# Patient Record
Sex: FEMALE | Race: WHITE | NOT HISPANIC OR LATINO | URBAN - METROPOLITAN AREA
[De-identification: names, ages, dates, MRNs, and addresses within clinical notes are randomized per-mention and may not be internally consistent; named-entity substitution may affect disease eponyms.]

---
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
## 2023-07-23 ENCOUNTER — NON-APPOINTMENT (OUTPATIENT)
Age: 18
End: 2023-07-23

## 2024-02-21 ENCOUNTER — HOSPITAL ENCOUNTER (EMERGENCY)
Facility: OTHER | Age: 19
Discharge: HOME | End: 2024-02-21
Attending: EMERGENCY MEDICINE
Payer: COMMERCIAL

## 2024-02-21 VITALS
HEIGHT: 62 IN | OXYGEN SATURATION: 100 % | BODY MASS INDEX: 19.32 KG/M2 | HEART RATE: 84 BPM | RESPIRATION RATE: 18 BRPM | DIASTOLIC BLOOD PRESSURE: 86 MMHG | SYSTOLIC BLOOD PRESSURE: 134 MMHG | WEIGHT: 105 LBS | TEMPERATURE: 98 F

## 2024-02-21 DIAGNOSIS — G43.009 ATYPICAL MIGRAINE: Primary | ICD-10-CM

## 2024-02-21 LAB
ANION GAP SERPL CALC-SCNC: 10 MMOL/L (ref 8–16)
B-HCG UR QL: NEGATIVE
BASOPHILS # BLD AUTO: 0.01 K/UL (ref 0–0.2)
BASOPHILS NFR BLD: 0.1 % (ref 0–1.9)
BUN SERPL-MCNC: 11 MG/DL (ref 6–20)
CALCIUM SERPL-MCNC: 9.9 MG/DL (ref 8.7–10.5)
CHLORIDE SERPL-SCNC: 104 MMOL/L (ref 95–110)
CO2 SERPL-SCNC: 26 MMOL/L (ref 23–29)
CREAT SERPL-MCNC: 0.8 MG/DL (ref 0.5–1.4)
CTP QC/QA: YES
DIFFERENTIAL METHOD BLD: ABNORMAL
EOSINOPHIL # BLD AUTO: 0 K/UL (ref 0–0.5)
EOSINOPHIL NFR BLD: 0 % (ref 0–8)
ERYTHROCYTE [DISTWIDTH] IN BLOOD BY AUTOMATED COUNT: 12.4 % (ref 11.5–14.5)
EST. GFR  (NO RACE VARIABLE): >60 ML/MIN/1.73 M^2
GLUCOSE SERPL-MCNC: 130 MG/DL (ref 70–110)
HCT VFR BLD AUTO: 41.6 % (ref 37–48.5)
HGB BLD-MCNC: 13.5 G/DL (ref 12–16)
IMM GRANULOCYTES # BLD AUTO: 0.03 K/UL (ref 0–0.04)
IMM GRANULOCYTES NFR BLD AUTO: 0.3 % (ref 0–0.5)
LYMPHOCYTES # BLD AUTO: 0.7 K/UL (ref 1–4.8)
LYMPHOCYTES NFR BLD: 8.2 % (ref 18–48)
MCH RBC QN AUTO: 28.4 PG (ref 27–31)
MCHC RBC AUTO-ENTMCNC: 32.5 G/DL (ref 32–36)
MCV RBC AUTO: 87 FL (ref 82–98)
MONOCYTES # BLD AUTO: 0.2 K/UL (ref 0.3–1)
MONOCYTES NFR BLD: 2 % (ref 4–15)
NEUTROPHILS # BLD AUTO: 8 K/UL (ref 1.8–7.7)
NEUTROPHILS NFR BLD: 89.4 % (ref 38–73)
NRBC BLD-RTO: 0 /100 WBC
PLATELET # BLD AUTO: 235 K/UL (ref 150–450)
PMV BLD AUTO: 10 FL (ref 9.2–12.9)
POTASSIUM SERPL-SCNC: 4.3 MMOL/L (ref 3.5–5.1)
RBC # BLD AUTO: 4.76 M/UL (ref 4–5.4)
SODIUM SERPL-SCNC: 140 MMOL/L (ref 136–145)
WBC # BLD AUTO: 9 K/UL (ref 3.9–12.7)

## 2024-02-21 PROCEDURE — 81025 URINE PREGNANCY TEST: CPT | Performed by: NURSE PRACTITIONER

## 2024-02-21 PROCEDURE — 85025 COMPLETE CBC W/AUTO DIFF WBC: CPT | Performed by: NURSE PRACTITIONER

## 2024-02-21 PROCEDURE — 96375 TX/PRO/DX INJ NEW DRUG ADDON: CPT

## 2024-02-21 PROCEDURE — 63600175 PHARM REV CODE 636 W HCPCS

## 2024-02-21 PROCEDURE — 80048 BASIC METABOLIC PNL TOTAL CA: CPT | Performed by: NURSE PRACTITIONER

## 2024-02-21 PROCEDURE — 25000003 PHARM REV CODE 250

## 2024-02-21 PROCEDURE — 25000003 PHARM REV CODE 250: Performed by: NURSE PRACTITIONER

## 2024-02-21 PROCEDURE — 96361 HYDRATE IV INFUSION ADD-ON: CPT

## 2024-02-21 PROCEDURE — 96374 THER/PROPH/DIAG INJ IV PUSH: CPT

## 2024-02-21 PROCEDURE — 99284 EMERGENCY DEPT VISIT MOD MDM: CPT | Mod: 25

## 2024-02-21 RX ORDER — ACETAMINOPHEN 500 MG
1000 TABLET ORAL
Status: DISCONTINUED | OUTPATIENT
Start: 2024-02-21 | End: 2024-02-21

## 2024-02-21 RX ORDER — DEXAMETHASONE SODIUM PHOSPHATE 4 MG/ML
8 INJECTION, SOLUTION INTRA-ARTICULAR; INTRALESIONAL; INTRAMUSCULAR; INTRAVENOUS; SOFT TISSUE
Status: COMPLETED | OUTPATIENT
Start: 2024-02-21 | End: 2024-02-21

## 2024-02-21 RX ORDER — KETOROLAC TROMETHAMINE 30 MG/ML
10 INJECTION, SOLUTION INTRAMUSCULAR; INTRAVENOUS
Status: COMPLETED | OUTPATIENT
Start: 2024-02-21 | End: 2024-02-21

## 2024-02-21 RX ORDER — DIPHENHYDRAMINE HYDROCHLORIDE 50 MG/ML
25 INJECTION INTRAMUSCULAR; INTRAVENOUS
Status: COMPLETED | OUTPATIENT
Start: 2024-02-21 | End: 2024-02-21

## 2024-02-21 RX ORDER — BUTALBITAL, ACETAMINOPHEN AND CAFFEINE 50; 325; 40 MG/1; MG/1; MG/1
1 TABLET ORAL EVERY 4 HOURS PRN
Qty: 12 TABLET | Refills: 0 | Status: SHIPPED | OUTPATIENT
Start: 2024-02-21 | End: 2024-02-23

## 2024-02-21 RX ORDER — BUTALBITAL, ACETAMINOPHEN AND CAFFEINE 50; 325; 40 MG/1; MG/1; MG/1
1 TABLET ORAL
Status: COMPLETED | OUTPATIENT
Start: 2024-02-21 | End: 2024-02-21

## 2024-02-21 RX ORDER — METOCLOPRAMIDE HYDROCHLORIDE 5 MG/ML
10 INJECTION INTRAMUSCULAR; INTRAVENOUS
Status: COMPLETED | OUTPATIENT
Start: 2024-02-21 | End: 2024-02-21

## 2024-02-21 RX ADMIN — DEXAMETHASONE SODIUM PHOSPHATE 8 MG: 4 INJECTION INTRA-ARTICULAR; INTRALESIONAL; INTRAMUSCULAR; INTRAVENOUS; SOFT TISSUE at 07:02

## 2024-02-21 RX ADMIN — DIPHENHYDRAMINE HYDROCHLORIDE 25 MG: 50 INJECTION, SOLUTION INTRAMUSCULAR; INTRAVENOUS at 04:02

## 2024-02-21 RX ADMIN — BUTALBITAL, ACETAMINOPHEN, AND CAFFEINE 1 TABLET: 50; 325; 40 TABLET ORAL at 06:02

## 2024-02-21 RX ADMIN — SODIUM CHLORIDE 1000 ML: 9 INJECTION, SOLUTION INTRAVENOUS at 03:02

## 2024-02-21 RX ADMIN — METOCLOPRAMIDE 10 MG: 5 INJECTION, SOLUTION INTRAMUSCULAR; INTRAVENOUS at 04:02

## 2024-02-21 RX ADMIN — KETOROLAC TROMETHAMINE 10 MG: 30 INJECTION, SOLUTION INTRAMUSCULAR; INTRAVENOUS at 04:02

## 2024-02-21 NOTE — ED NOTES
Assumed care of pt. Pt reporting 4 days of migraine with pain in head radiating down R jaw and neck. Feels tingling in face. Smile and tongue symmetrical.

## 2024-02-21 NOTE — FIRST PROVIDER EVALUATION
" Emergency Department TeleTriage Encounter Note      CHIEF COMPLAINT    Chief Complaint   Patient presents with    Headache     Pt reporting R sided HA x 4 days with associated R sided facial numbness and R sided neck pain. Hx of migraines. Pt also endorsing nausea. Denies vision changes.       VITAL SIGNS   Initial Vitals [02/21/24 1419]   BP Pulse Resp Temp SpO2   139/83 110 19 97.6 °F (36.4 °C) 98 %      MAP       --            ALLERGIES    Review of patient's allergies indicates:   Allergen Reactions    Azithromycin Other (See Comments)    Amoxicillin Rash    Penicillins Rash       PROVIDER TRIAGE NOTE  This is a teletriage evaluation of a 18 y.o. female presenting to the ED complaining of right-sided headache for four days. Reports "tingling" in her face since yesterday and also right sided neck pain. PMhx of migraines. Pmhx of hashimoto and states that sometimes "when my levels are off, I feel bad like I do now."    Alert, no obvious focal neuro deficit noted. Moving BUE and face symmetrically.     Initial orders will be placed and care will be transferred to an alternate provider when patient is roomed for a full evaluation. Any additional orders and the final disposition will be determined by that provider.         ORDERS  Labs Reviewed - No data to display    ED Orders (720h ago, onward)      Start Ordered     Status Ordering Provider    02/21/24 1445 02/21/24 1430  sodium chloride 0.9% bolus 1,000 mL 1,000 mL  ED 1 Time         Ordered NATIVIDAD MURARY N.    02/21/24 1430 02/21/24 1430  CBC auto differential  STAT         Ordered NATIVIDAD MURRAY.    02/21/24 1430 02/21/24 1430  Basic metabolic panel  STAT         Ordered NATIVIDAD MURRAY N.    02/21/24 1430 02/21/24 1430  Insert Saline lock IV  Once         Ordered NATIVIDAD MURRAY N.    02/21/24 1430 02/21/24 1430  POCT urine pregnancy  Once         Ordered NATIVIDAD MURRAY              Virtual Visit Note: The provider " triage portion of this emergency department evaluation and documentation was performed via [employer], a HIPAA-compliant telemedicine application, in concert with a tele-presenter in the room. A face to face patient evaluation with one of my colleagues will occur once the patient is placed in an emergency department room.      DISCLAIMER: This note was prepared with [employer] voice recognition transcription software. Garbled syntax, mangled pronouns, and other bizarre constructions may be attributed to that software system.

## 2024-02-21 NOTE — ED TRIAGE NOTES
C/o right sided headache x 4 days. States had virtual appt with neurologist on Monday and was prescribed Dexamethasone for symptoms - states taking with no relief. Went to  yesterday and received Toradol injection with no relief. States right side of face feels numb but facial movements are symmetrical. Denies cough/cold symptoms but states sinuses do feel congested. No fever reported. Presents awake, alert.

## 2024-02-21 NOTE — Clinical Note
"Luz Elena "Livan Mendoza was seen and treated in our emergency department on 2/21/2024.  She may return to school on 02/22/2024.      If you have any questions or concerns, please don't hesitate to call.      Flaca Platt PA-C"

## 2024-02-22 NOTE — ED PROVIDER NOTES
Encounter Date: 2/21/2024       History     Chief Complaint   Patient presents with    Headache     Pt reporting R sided HA x 4 days with associated R sided facial numbness and R sided neck pain. Hx of migraines. Pt also endorsing nausea. Denies vision changes.     18-year-old female with past medical history of migraines presenting for evaluation of right-sided headache with associated right-sided facial numbness and right-sided arm pain x4 days.  Patient states that she developed a migraine that she describes as a sharp pain behind her bilateral eyes 4 days ago.  She states that this was consistent with her typical migraines and she reached out to her neurologist at home, who prescribed her a steroid pack.  She reports that she has been taking the steroid pack and her initial migraine improved, however she continues to have a dull aching pain to the right side of her head that is not consistent with her normal migraines. She rates this pain a 6/10. She also reports that this pain radiates to the right side of her face and she reports associated numbness/tingling feeling.  She states she was also had an numbness and tingling present to her right hand along with a pain to her right upper back. She reports some mild associated nausea, no vomiting. She denies any trauma or injury.  She reports being seen at urgent care yesterday and was given a toradol shot with no improvement in symptoms. She denies any fever, chills, nausea, vomiting.  She denies any visual changes.  Reports taking the steroid Dosepak with no relief.  She also reports that she is on a monthly injection for migraines.  She denies any associated weakness.  Denies any fever, chills, cough, congestion, abdominal pain,vomiting, chest pain, shortness of breath.    The history is provided by the patient.     Review of patient's allergies indicates:   Allergen Reactions    Azithromycin Other (See Comments)    Amoxicillin Rash    Penicillins Rash     No past  medical history on file.  No past surgical history on file.  No family history on file.  Social History     Tobacco Use    Smoking status: Never    Smokeless tobacco: Never     Review of Systems   Constitutional:  Negative for chills and fever.   HENT:  Negative for congestion, ear discharge, ear pain, hearing loss, sinus pressure, sinus pain and sore throat.    Eyes:  Negative for visual disturbance.   Respiratory:  Negative for shortness of breath.    Cardiovascular:  Negative for chest pain.   Gastrointestinal:  Positive for nausea. Negative for abdominal pain and vomiting.   Genitourinary:  Negative for difficulty urinating and dysuria.   Musculoskeletal:  Negative for arthralgias and back pain.   Skin:  Negative for rash.   Neurological:  Positive for headaches. Negative for syncope and light-headedness.   story    Physical Exam     Initial Vitals [02/21/24 1419]   BP Pulse Resp Temp SpO2   139/83 110 19 97.6 °F (36.4 °C) 98 %      MAP       --         Physical Exam    Constitutional: She appears well-developed and well-nourished. She is not diaphoretic. No distress.   Patient laying comfortably in bed playing on phone.   HENT:   Head: Normocephalic and atraumatic.   No visible swelling, no tenderness to palpation of the face/head. No swelling/erythema of bilateral TM. Left TM unable to be visualized secondary to cerumen impaction. Right TM partially visualized, partially obscured secondary to cerumen. Visualized portion of TM with no retraction, erythema, bulging. Pain to right jaw elicited with opening jaw, pain present overlying TMJ.    Eyes: Conjunctivae and EOM are normal. Pupils are equal, round, and reactive to light.   No photophobia.    Cardiovascular:  Normal rate, regular rhythm and normal heart sounds.           No murmur heard.  Abdominal: Abdomen is soft. She exhibits no distension. There is no abdominal tenderness.   Musculoskeletal:         General: Normal range of motion.     Neurological: She  is alert and oriented to person, place, and time. GCS score is 15. GCS eye subscore is 4. GCS verbal subscore is 5. GCS motor subscore is 6.   Sensation and motor intact x4 extremities.  Strength is equal bilaterally upper and lower extremities.  Cranial nerves 2-12 intact.  Cerebellar function intact.  Gait intact.  No pronator drift.  No facial droop.  Speech is clear and coherent.   Skin: Skin is warm and dry.   Psychiatric: She has a normal mood and affect.         ED Course   Procedures  Labs Reviewed   CBC W/ AUTO DIFFERENTIAL - Abnormal; Notable for the following components:       Result Value    Gran # (ANC) 8.0 (*)     Lymph # 0.7 (*)     Mono # 0.2 (*)     Gran % 89.4 (*)     Lymph % 8.2 (*)     Mono % 2.0 (*)     All other components within normal limits   BASIC METABOLIC PANEL - Abnormal; Notable for the following components:    Glucose 130 (*)     All other components within normal limits   POCT URINE PREGNANCY          Imaging Results              CT Head Without Contrast (Final result)  Result time 02/21/24 17:58:16      Final result by Osmany Rg MD (02/21/24 17:58:16)                   Impression:      No acute intracranial abnormality.  Specifically, no intracranial hemorrhage.      Electronically signed by: Osmany Rg MD  Date:    02/21/2024  Time:    17:58               Narrative:    EXAMINATION:  CT HEAD WITHOUT CONTRAST    CLINICAL HISTORY:  Headache, chronic, new features or increased frequency;    TECHNIQUE:  Low dose axial CT images obtained throughout the head without intravenous contrast. Sagittal and coronal reconstructions were performed.    COMPARISON:  None.    FINDINGS:  Intracranial compartment: Brain appears normally formed.    Ventricles and sulci are normal in size for age without evidence of hydrocephalus. No extra-axial blood or fluid collections.    The brain parenchyma appears normal. No parenchymal mass, hemorrhage, edema or major vascular distribution  infarct.    Skull/extracranial contents (limited evaluation): No fracture. Mastoid air cells and paranasal sinuses are essentially clear.  Imaged portions of the orbits are within normal limits.                                       Medications   sodium chloride 0.9% bolus 1,000 mL 1,000 mL (0 mLs Intravenous Stopped 2/21/24 1619)   ketorolac injection 9.999 mg (9.999 mg Intravenous Given 2/21/24 1620)   diphenhydrAMINE injection 25 mg (25 mg Intravenous Given 2/21/24 1620)   metoclopramide injection 10 mg (10 mg Intravenous Given 2/21/24 1620)   butalbital-acetaminophen-caffeine -40 mg per tablet 1 tablet (1 tablet Oral Given 2/21/24 1817)   dexAMETHasone injection 8 mg (8 mg Intravenous Given 2/21/24 1947)     Medical Decision Making  Urgent evaluation of 18 year old female with PMH of migraines and thyroid problem presenting for evaluation of headache x 4 days. Patient afebrile and hemodynamically stable on evaluation. No acute distress, resting comfortably in bed. Normal neurologic exam, no deficits noted. Patient reporting paresthesias to right face/hand, however on my exam there are no sensory deficits noted. Low suspicion for acute etiology such as CVA/TIA. Patient with history of headaches and migraines on chart review, appears she has received MRI in the past with no acute results. Do not believe emergent imaging is indicated. Labs ordered by teletriage provider unremarkable. Suspect patient's symptoms secondary to atypical migraine, also may have component of TMJ given jaw pain/paresthesias. Will give headache cocktail and reassess.    Differential Diagnosis includes, but is not limited to:  Ischemic stroke, hemorrhagic stroke, subarachnoid hemorrhage/ruptured aneurysm, intracranial lesion/mass, meningitis/encephalitis, epidural hematoma, subdural hematoma, pseudotumor cerebri, venous sinus thrombosis, CO poisoning, hypertensive encephalopathy, MI/ACS, head trauma/contusion, concussion, sinus  headache, dehydration, anxiety, medication non-compliance, primary headache (tension/cluster/migraine).    On reassessment after headache cocktail, patient reported minimal improvement in symptoms. Discussed this option of imaging with patient, given migraine differs in character from normal migraines and minimal improvement with medication, will obtain CT head, give dose of fioricet for pain. Patient with normal CT head, minimal improvement with fioricet. Discussed further medication management with patient to break cycle of headache, gave dose of IV decadron. Patient on re-evaluation at this point requesting discharge, stating she still has pain and paresthesias but they are mildly improved, and she would prefer to follow up outpatient. Suspect component of TMJ to symptoms given pain elicited with jaw movement. Do not suspect acute emergent etiology of symptoms today. No indication for further emergent management at this time. Patient is returning home in 2 weeks and has an ENT and neuro provider she sees at home, I advised close follow up with these providers. Strict ER return precautions given. I discussed this case with my attending provider, Dr. Milton, who was in agreement with plan.     Amount and/or Complexity of Data Reviewed  Labs:  Decision-making details documented in ED Course.  Radiology: ordered.    Risk  Prescription drug management.               ED Course as of 02/21/24 2150   Wed Feb 21, 2024   1551 hCG Qualitative, Urine: Negative [SW]      ED Course User Index  [SW] Flaca Platt PA-C                           Clinical Impression:  Final diagnoses:  [G43.009] Atypical migraine (Primary)          ED Disposition Condition    Discharge           ED Prescriptions       Medication Sig Dispense Start Date End Date Auth. Provider    butalbital-acetaminophen-caffeine -40 mg (FIORICET, ESGIC) -40 mg per tablet Take 1 tablet by mouth every 4 (four) hours as needed for Pain or Headaches. 12  tablet 2/21/2024 3/22/2024 Flaca Platt PA-C          Follow-up Information       Follow up With Specialties Details Why Contact Info    Your ENT  Schedule an appointment as soon as possible for a visit       Your neurologist  Schedule an appointment as soon as possible for a visit       [facility], [place] Emergency Dept Emergency Medicine Go to  If symptoms worsen [address] [address]  [phone]             Flaca Platt PA-C  02/22/24 0353

## 2024-02-22 NOTE — DISCHARGE INSTRUCTIONS

## 2024-02-23 ENCOUNTER — OFFICE VISIT (OUTPATIENT)
Dept: INTERNAL MEDICINE | Facility: CLINIC | Age: 19
End: 2024-02-23
Payer: COMMERCIAL

## 2024-02-23 ENCOUNTER — LAB VISIT (OUTPATIENT)
Dept: LAB | Facility: HOSPITAL | Age: 19
End: 2024-02-23
Payer: COMMERCIAL

## 2024-02-23 VITALS
HEIGHT: 62 IN | SYSTOLIC BLOOD PRESSURE: 112 MMHG | DIASTOLIC BLOOD PRESSURE: 62 MMHG | WEIGHT: 108.94 LBS | HEART RATE: 95 BPM | OXYGEN SATURATION: 99 % | BODY MASS INDEX: 20.05 KG/M2

## 2024-02-23 DIAGNOSIS — R20.0 RIGHT FACIAL NUMBNESS: ICD-10-CM

## 2024-02-23 DIAGNOSIS — R20.2 NUMBNESS AND TINGLING SENSATION OF SKIN: ICD-10-CM

## 2024-02-23 DIAGNOSIS — M79.10 MUSCLE SORENESS: ICD-10-CM

## 2024-02-23 DIAGNOSIS — G43.919 INTRACTABLE MIGRAINE WITHOUT STATUS MIGRAINOSUS, UNSPECIFIED MIGRAINE TYPE: Primary | ICD-10-CM

## 2024-02-23 DIAGNOSIS — R20.0 NUMBNESS AND TINGLING SENSATION OF SKIN: ICD-10-CM

## 2024-02-23 DIAGNOSIS — G43.919 INTRACTABLE MIGRAINE WITHOUT STATUS MIGRAINOSUS, UNSPECIFIED MIGRAINE TYPE: ICD-10-CM

## 2024-02-23 DIAGNOSIS — E07.9 THYROID DISEASE: ICD-10-CM

## 2024-02-23 DIAGNOSIS — M26.621 ARTHRALGIA OF RIGHT TEMPOROMANDIBULAR JOINT: ICD-10-CM

## 2024-02-23 LAB
ALBUMIN SERPL BCP-MCNC: 4.6 G/DL (ref 3.5–5.2)
ALP SERPL-CCNC: 71 U/L (ref 55–135)
ALT SERPL W/O P-5'-P-CCNC: 27 U/L (ref 10–44)
ANION GAP SERPL CALC-SCNC: 7 MMOL/L (ref 8–16)
AST SERPL-CCNC: 22 U/L (ref 10–40)
BASOPHILS # BLD AUTO: 0.02 K/UL (ref 0–0.2)
BASOPHILS NFR BLD: 0.3 % (ref 0–1.9)
BILIRUB SERPL-MCNC: 0.8 MG/DL (ref 0.1–1)
BUN SERPL-MCNC: 9 MG/DL (ref 6–20)
CALCIUM SERPL-MCNC: 9.5 MG/DL (ref 8.7–10.5)
CHLORIDE SERPL-SCNC: 105 MMOL/L (ref 95–110)
CK SERPL-CCNC: 529 U/L (ref 20–180)
CO2 SERPL-SCNC: 26 MMOL/L (ref 23–29)
CREAT SERPL-MCNC: 0.7 MG/DL (ref 0.5–1.4)
CRP SERPL-MCNC: <0.3 MG/L (ref 0–8.2)
DIFFERENTIAL METHOD BLD: ABNORMAL
EOSINOPHIL # BLD AUTO: 0 K/UL (ref 0–0.5)
EOSINOPHIL NFR BLD: 0.3 % (ref 0–8)
ERYTHROCYTE [DISTWIDTH] IN BLOOD BY AUTOMATED COUNT: 12.4 % (ref 11.5–14.5)
ERYTHROCYTE [SEDIMENTATION RATE] IN BLOOD BY PHOTOMETRIC METHOD: <2 MM/HR (ref 0–36)
EST. GFR  (NO RACE VARIABLE): >60 ML/MIN/1.73 M^2
GLUCOSE SERPL-MCNC: 96 MG/DL (ref 70–110)
HCT VFR BLD AUTO: 45.8 % (ref 37–48.5)
HGB BLD-MCNC: 14.1 G/DL (ref 12–16)
IMM GRANULOCYTES # BLD AUTO: 0.01 K/UL (ref 0–0.04)
IMM GRANULOCYTES NFR BLD AUTO: 0.2 % (ref 0–0.5)
LYMPHOCYTES # BLD AUTO: 1.9 K/UL (ref 1–4.8)
LYMPHOCYTES NFR BLD: 29.1 % (ref 18–48)
MCH RBC QN AUTO: 28.3 PG (ref 27–31)
MCHC RBC AUTO-ENTMCNC: 30.8 G/DL (ref 32–36)
MCV RBC AUTO: 92 FL (ref 82–98)
MONOCYTES # BLD AUTO: 0.5 K/UL (ref 0.3–1)
MONOCYTES NFR BLD: 6.8 % (ref 4–15)
NEUTROPHILS # BLD AUTO: 4.2 K/UL (ref 1.8–7.7)
NEUTROPHILS NFR BLD: 63.3 % (ref 38–73)
NRBC BLD-RTO: 0 /100 WBC
PLATELET # BLD AUTO: 258 K/UL (ref 150–450)
PMV BLD AUTO: 10.3 FL (ref 9.2–12.9)
POTASSIUM SERPL-SCNC: 4.1 MMOL/L (ref 3.5–5.1)
PROT SERPL-MCNC: 7.6 G/DL (ref 6–8.4)
RBC # BLD AUTO: 4.99 M/UL (ref 4–5.4)
SODIUM SERPL-SCNC: 138 MMOL/L (ref 136–145)
TSH SERPL DL<=0.005 MIU/L-ACNC: 2.99 UIU/ML (ref 0.4–4)
WBC # BLD AUTO: 6.59 K/UL (ref 3.9–12.7)

## 2024-02-23 PROCEDURE — 36415 COLL VENOUS BLD VENIPUNCTURE: CPT | Performed by: PHYSICIAN ASSISTANT

## 2024-02-23 PROCEDURE — 99999 PR PBB SHADOW E&M-EST. PATIENT-LVL V: CPT | Mod: PBBFAC,,, | Performed by: PHYSICIAN ASSISTANT

## 2024-02-23 PROCEDURE — 80053 COMPREHEN METABOLIC PANEL: CPT | Performed by: PHYSICIAN ASSISTANT

## 2024-02-23 PROCEDURE — 82550 ASSAY OF CK (CPK): CPT | Performed by: PHYSICIAN ASSISTANT

## 2024-02-23 PROCEDURE — 84443 ASSAY THYROID STIM HORMONE: CPT | Performed by: PHYSICIAN ASSISTANT

## 2024-02-23 PROCEDURE — 85652 RBC SED RATE AUTOMATED: CPT | Performed by: PHYSICIAN ASSISTANT

## 2024-02-23 PROCEDURE — 85025 COMPLETE CBC W/AUTO DIFF WBC: CPT | Performed by: PHYSICIAN ASSISTANT

## 2024-02-23 PROCEDURE — 86140 C-REACTIVE PROTEIN: CPT | Performed by: PHYSICIAN ASSISTANT

## 2024-02-23 PROCEDURE — 99214 OFFICE O/P EST MOD 30 MIN: CPT | Mod: S$GLB,,, | Performed by: PHYSICIAN ASSISTANT

## 2024-02-23 RX ORDER — GABAPENTIN 300 MG/1
300 CAPSULE ORAL 3 TIMES DAILY
Qty: 90 CAPSULE | Refills: 11 | Status: SHIPPED | OUTPATIENT
Start: 2024-02-23 | End: 2025-02-22

## 2024-02-23 RX ORDER — TIZANIDINE 4 MG/1
4 TABLET ORAL NIGHTLY PRN
Qty: 20 TABLET | Refills: 0 | Status: SHIPPED | OUTPATIENT
Start: 2024-02-23 | End: 2024-03-04

## 2024-02-23 RX ORDER — GALCANEZUMAB 120 MG/ML
INJECTION, SOLUTION SUBCUTANEOUS
COMMUNITY
Start: 2024-01-30

## 2024-02-23 RX ORDER — DEXAMETHASONE 4 MG/1
TABLET ORAL
COMMUNITY
Start: 2024-02-19

## 2024-02-23 NOTE — PROGRESS NOTES
Subjective     Patient ID: Luz Elena Mendoza is a 18 y.o. female with PMH of migraines and thyroid    Chief Complaint: Numbness (Hands, toes, jaw) and Back Pain    HPI    Established pt of No, Primary Doctor (new to me)    Pt here with concerns of migraine, n/t sensation and back pain.     Pt attended by boyfriend. Also spoke with mom via phone    Symptoms onset was 6 days ago with a Migraine(right sided, typical), her Neurologist back home in [place] prescribed steroids. The next day migraine pain continued and she started to experience numbness to right face, she was seen in the urgent care, treated with IM Toradol but symptoms persist prompting her to be seen in the ED 2 days ago, in the ED lab and CT scan were obtained, unremarkable. She was treated with  IV fluids, Fioricet, Toradol and steroid with only mild relief. Per chart review per requested discharge and outpatient follow up.       Today she reports migraines is a dull ache, most concern is the numbness sensation to right face, hands and toes feel tingling, also notes soreness/tightness of back.     She is taking tylenol and continues on steroids as prescribed by her Neurologist    Other migraine meds is Emgality monthly and Nurtec.     No fevers, cough, sob, chills, dizziness, n/v/d or rashes.     Past Medical History:   Diagnosis Date    Migraines     Thyroid disease        Social History     Tobacco Use    Smoking status: Never    Smokeless tobacco: Never     Review of patient's allergies indicates:   Allergen Reactions    Azithromycin Other (See Comments)    Amoxicillin Rash    Penicillins Rash       Review of Systems   Constitutional:  Negative for chills, diaphoresis and fever.   Respiratory:  Negative for cough and shortness of breath.    Cardiovascular:  Negative for chest pain and leg swelling.   Gastrointestinal:  Negative for abdominal pain, nausea and vomiting.   Musculoskeletal:  Positive for back pain and myalgias.   Integumentary:  Negative for  rash.   Neurological:  Positive for numbness and headaches. Negative for dizziness, tremors, seizures, syncope, facial asymmetry, speech difficulty, weakness and coordination difficulties.          Objective     Physical Exam  Vitals reviewed.   Constitutional:       General: She is not in acute distress.     Appearance: She is well-developed. She is not ill-appearing, toxic-appearing or diaphoretic.   HENT:      Head: Normocephalic and atraumatic.      Right Ear: Tympanic membrane, ear canal and external ear normal.      Left Ear: Tympanic membrane, ear canal and external ear normal.   Eyes:      Extraocular Movements: Extraocular movements intact.      Conjunctiva/sclera: Conjunctivae normal.   Cardiovascular:      Rate and Rhythm: Normal rate and regular rhythm.      Heart sounds: No murmur heard.  Pulmonary:      Effort: Pulmonary effort is normal.      Breath sounds: Normal breath sounds. No wheezing or rales.   Abdominal:      General: Bowel sounds are normal.      Palpations: Abdomen is soft.      Tenderness: There is no abdominal tenderness.   Musculoskeletal:      Cervical back: Muscular tenderness present. Normal range of motion.        Back:       Right lower leg: No edema.      Left lower leg: No edema.      Comments: Generalized mild soreness/ttp to mid/upper back  5/5 strength to BUE/BLE   Lymphadenopathy:      Cervical: No cervical adenopathy.   Skin:     General: Skin is warm and dry.      Findings: No rash.   Neurological:      Mental Status: She is alert.      Cranial Nerves: No cranial nerve deficit.      Sensory: No sensory deficit.      Motor: No weakness.      Coordination: Coordination normal.      Gait: Gait normal.   Psychiatric:         Mood and Affect: Mood normal.            Assessment and Plan     1. Intractable migraine without status migrainosus, unspecified migraine type  -     CBC Auto Differential; Future; Expected date: 02/23/2024  -     Comprehensive Metabolic Panel; Future;  Expected date: 02/23/2024  -     CK; Future; Expected date: 02/23/2024  -     Sedimentation rate; Future; Expected date: 02/23/2024  -     C-REACTIVE PROTEIN; Future; Expected date: 02/23/2024  -     Ambulatory referral/consult to Neurology; Future; Expected date: 03/01/2024  -     MRI Brain Without Contrast; Future; Expected date: 02/23/2024    2. Muscle soreness  -     CBC Auto Differential; Future; Expected date: 02/23/2024  -     Comprehensive Metabolic Panel; Future; Expected date: 02/23/2024  -     CK; Future; Expected date: 02/23/2024  -     Sedimentation rate; Future; Expected date: 02/23/2024  -     C-REACTIVE PROTEIN; Future; Expected date: 02/23/2024    3. Numbness and tingling sensation of skin  -     gabapentin (NEURONTIN) 300 MG capsule; Take 1 capsule (300 mg total) by mouth 3 (three) times daily.  Dispense: 90 capsule; Refill: 11    4. Thyroid disease  -     TSH; Future; Expected date: 02/23/2024    5. Right facial numbness  -     CBC Auto Differential; Future; Expected date: 02/23/2024  -     Comprehensive Metabolic Panel; Future; Expected date: 02/23/2024  -     CK; Future; Expected date: 02/23/2024  -     Sedimentation rate; Future; Expected date: 02/23/2024  -     gabapentin (NEURONTIN) 300 MG capsule; Take 1 capsule (300 mg total) by mouth 3 (three) times daily.  Dispense: 90 capsule; Refill: 11    6. Arthralgia of right temporomandibular joint  -     tiZANidine (ZANAFLEX) 4 MG tablet; Take 1 tablet (4 mg total) by mouth nightly as needed (muscles soreness/back pain).  Dispense: 20 tablet; Refill: 0      VSS  No focal deficit on exam  Sensation to touch intact  ED visit reviewed.   Array of nonspecific symptoms which maybe atypical migraine but  Further evaluation as above given new features to rule out other etiologies  MRI ordered  Referred to Neurology for further workup/evaluation  Trial of gabapentin to help with migraine, n/t sensations  Muscle relaxer for muscle soreness and TMJ  arthralgia  Strict ED precautions advised.     Ale Carrillo PA-C

## 2024-02-23 NOTE — LETTER
February 23, 2024    Luz Elena Mendoza  [address] [address]       [facility]  Internal Medicine  [address]  [facility] [address]  Phone: [phone]  Fax: [phone]   February 23, 2024     Patient: Luz Elena Mendoza   YOB: 2005   Date of Visit: 2/23/2024       To Whom it May Concern:    Luz Elena Mendoza was seen in my clinic on 2/23/2024.     Please excuse her from any classes or work missed.    If you have any questions or concerns, please don't hesitate to call.    Sincerely,           Ale Carrillo PA-C

## 2024-02-24 ENCOUNTER — TELEPHONE (OUTPATIENT)
Dept: INTERNAL MEDICINE | Facility: CLINIC | Age: 19
End: 2024-02-24
Payer: COMMERCIAL

## 2024-02-24 DIAGNOSIS — M79.10 MUSCLE PAIN: Primary | ICD-10-CM

## 2024-02-24 NOTE — TELEPHONE ENCOUNTER
Spoke to pt and she agreed to come for lab on Monday 02/26/24.    ----- Message from Jazlyn Lin MA sent at 2/24/2024 11:53 AM CST -----  Contact: self  [phone]  Please advise.   ----- Message -----  From: Concepcion Ann  Sent: 2/24/2024  11:52 AM CST  To: Darien OLIVAREZ Staff    Cpk test result cam back high requesting a call to discuss seen paco cabrera yesterday will like Leonor Ledezma to call her today.    Please call and advise

## 2024-02-24 NOTE — TELEPHONE ENCOUNTER
Spoke with triage nurse who had received a call from patient with concerns about elevated CPK.  She was seen in clinic yesterday for muscle pain along with migraines and some tingling.  All other labs were reviewed and were normal.  I told her ideally we could get a repeat CPK today however the lab is closing or is already closed.  I communicated with nurse that she should tell the patient if symptoms seem worse or she has worsening muscle pain should go to the ER but since her level was not severely elevated she can focus on drinking a lot of fluids including electrolytes and do a repeat CK on Monday.

## 2024-02-24 NOTE — TELEPHONE ENCOUNTER
Notified Dr.Brandon Huff of message from lab stating        Component Ref Range & Units 1 d ago   CPK 20 - 180 U/L 529 High         He reviewed all of the labs drawn yesterday, labs from 3 days ago and 's,PA notes from yesterday's visit and stated he will order repeat lab for Monday.  Lm for pt to check portal and come back on Monday to repeat lab.     Called pt's mother and she was able to get her to come on Monday for lab.

## 2024-02-26 ENCOUNTER — LAB VISIT (OUTPATIENT)
Dept: LAB | Facility: HOSPITAL | Age: 19
End: 2024-02-26
Attending: FAMILY MEDICINE
Payer: COMMERCIAL

## 2024-02-26 DIAGNOSIS — M79.10 MUSCLE PAIN: ICD-10-CM

## 2024-02-26 LAB — CK SERPL-CCNC: 47 U/L (ref 20–180)

## 2024-02-26 PROCEDURE — 82550 ASSAY OF CK (CPK): CPT | Performed by: FAMILY MEDICINE

## 2024-02-26 PROCEDURE — 36415 COLL VENOUS BLD VENIPUNCTURE: CPT | Performed by: FAMILY MEDICINE

## 2024-02-27 ENCOUNTER — TELEPHONE (OUTPATIENT)
Dept: INTERNAL MEDICINE | Facility: CLINIC | Age: 19
End: 2024-02-27
Payer: COMMERCIAL

## 2024-02-27 ENCOUNTER — HOSPITAL ENCOUNTER (OUTPATIENT)
Dept: RADIOLOGY | Facility: HOSPITAL | Age: 19
Discharge: HOME | End: 2024-02-27
Attending: PHYSICIAN ASSISTANT
Payer: COMMERCIAL

## 2024-02-27 DIAGNOSIS — G43.919 INTRACTABLE MIGRAINE WITHOUT STATUS MIGRAINOSUS, UNSPECIFIED MIGRAINE TYPE: ICD-10-CM

## 2024-02-27 PROCEDURE — 70551 MRI BRAIN STEM W/O DYE: CPT | Mod: TC

## 2024-02-27 PROCEDURE — 70551 MRI BRAIN STEM W/O DYE: CPT | Mod: 26,,, | Performed by: RADIOLOGY

## 2024-02-27 NOTE — TELEPHONE ENCOUNTER
Spoke to mom and pt.   Reviewed results  Pt states her symptoms are about the same  Back still bothering her, feels tingling in her R arm, face/jaw not as bad  She is taking the gabapentin and zanaflex  Finished steroid per her neurologist  She goes for a MRI today and she is flying back home to [place] tomorrow to follow up with her neurologist there.

## 2024-02-27 NOTE — TELEPHONE ENCOUNTER
K  Order: [id number]  Status: Final result       Visible to patient: Yes (seen)       Next appt: Today at 02:45 PM in Radiology ([facility])       Dx: Muscle pain    0 Result Notes        Component Ref Range & Units 1 d ago 4 d ago   CPK 20 - 180 U/L 47 529 High    Resulting Agency  [facility]              Specimen Collected: 02/26/24 08:49 CST Last Resulted: 02/26/24 18:49 CST        Lab Flowsheet        Order Details        View Encounter        Lab and Collection Details        Routing        Result History     View All Conversations on this Encounter           Result Care Coordination      Patient Communication     Add Comments   Seen Back to Top             Lab Inquiry View Complete Results

## 2024-02-27 NOTE — TELEPHONE ENCOUNTER
----- Message from Yakelin Wagoner sent at 2/26/2024  4:55 PM CST -----  Contact: [phone](porsha Janet Mendoza)  2TESTRESULTS    Type: Test Results    What test was performed? today    Who ordered the test?marlin prasanth    When and where were the test performed? [facility]    Would you like response via [email or web address]: Call back

## 2025-02-05 ENCOUNTER — NURSE TRIAGE (OUTPATIENT)
Dept: ADMINISTRATIVE | Facility: CLINIC | Age: 20
End: 2025-02-05
Payer: COMMERCIAL

## 2025-02-05 NOTE — TELEPHONE ENCOUNTER
Patient c/o moderate to severe menstrual cramps. Also reports longer than usual menstrual cycle. Advised per protocol to contact her provider within 24 hours. Plans to contact Dr. rPado to establish care. Will also send a message to the office to f/u with the patient. Advised the patient to call back with any further questions or if symptoms worsen.        Reason for Disposition   [1] Pain present > 3 days AND [2] normally menstrual cramps last 1 to 3 days    Additional Information   Negative: Sounds like a life-threatening emergency to the triager   Negative: [1] SEVERE pain AND [2] pain clearly increases with coughing   Negative: SEVERE vaginal bleeding (e.g., soaking 2 pads or tampons per hour and present 2 or more hours; 1 menstrual cup every 2 hours)   Negative: Patient sounds very sick or weak to the triager   Negative: [1] SEVERE pain (e.g., excruciating cramps) AND [2] worse than ever before AND [3] not improved with ibuprofen or naproxen   Negative: [1] MODERATE vaginal bleeding (e.g., soaking 1 pad or tampon per hour and present > 6 hours; 1 menstrual cup every 6 hours) AND [2] worse than ever before   Negative: Fever > 100.4 F (38.0 C)   Negative: Could be pregnant (e.g., missed last menstrual period)   Negative: Pain only on one side   Negative: Unusual vaginal discharge (e.g., odorous, yellow, green, or foamy-white)    Protocols used: Abdominal Pain - Menstrual Cramps-A-AH

## 2025-02-06 ENCOUNTER — TELEPHONE (OUTPATIENT)
Dept: OBSTETRICS AND GYNECOLOGY | Facility: CLINIC | Age: 20
End: 2025-02-06
Payer: COMMERCIAL

## 2025-02-06 NOTE — TELEPHONE ENCOUNTER
----- Message from Dariel sent at 2/6/2025  9:32 AM CST -----  Regarding: Self [phone]  Type: Patient Call Back    Who called: Self     What is the request in detail: pt called to follow up on a message sent yesterday to get an appt for the pt sometime asap due to bleeding while having an IUD. Would like a call back.     Can the clinic reply by [facility]? No     Would the patient rather a call back or a response via [facility]? Call back     Best call back number: [phone]     Additional Information:    Thank you.

## 2025-02-11 ENCOUNTER — OFFICE VISIT (OUTPATIENT)
Dept: OBSTETRICS AND GYNECOLOGY | Facility: CLINIC | Age: 20
End: 2025-02-11
Payer: COMMERCIAL

## 2025-02-11 VITALS
SYSTOLIC BLOOD PRESSURE: 104 MMHG | DIASTOLIC BLOOD PRESSURE: 68 MMHG | BODY MASS INDEX: 19.84 KG/M2 | HEIGHT: 62 IN | WEIGHT: 107.81 LBS

## 2025-02-11 DIAGNOSIS — Z30.431 IUD CHECK UP: Primary | ICD-10-CM

## 2025-02-11 LAB
B-HCG UR QL: NEGATIVE
CTP QC/QA: YES

## 2025-02-11 PROCEDURE — 99999 PR PBB SHADOW E&M-EST. PATIENT-LVL III: CPT | Mod: PBBFAC,,, | Performed by: NURSE PRACTITIONER

## 2025-02-11 RX ORDER — LANOLIN ALCOHOL/MO/W.PET/CERES
400 CREAM (GRAM) TOPICAL DAILY
COMMUNITY

## 2025-02-11 NOTE — PROGRESS NOTES
Chief Complaint: IUD Check     HPI:      Luz Elena is a 19 y.o. No obstetric history on file. who presents today for IUD check     Luz Elena is currently sexually active with a single male partner - monogamous with boyfriend for 3 years. She is currently using Kyleena inserted 1.5-2 years ago for contraception.     Typically she is either amenorrheic with IUD or has intermittent, light bleeding.    Starting on 1/29, she started experiencing bleeding that lasted 2 weeks. Flow was overall heavy. She was also having significant cramping with IUD. She stopped bleeding 2 days ago.   Currently - no bleeding or pain.     She has a h/o 2 ruptured ovarian cysts. She was started on OCPs in 9th grade due to the ovarian cysts.   She then started experiencing migraines so was switched to IUD. She is still experiencing migraines. Denies aura. No pattern that she has identified - migraines do not always appear to be related to her menses. Currently on Emgality for migraines; also gets botox w/ relief.     She has a h/o Hashimoto's. Currently on Levothyroxine. She had normal thyroid labs in 12/2024.     She has also been seeing a GI specialist d/t nausea and reflex. She has an endoscopy scheduled.     Previous Pap:  (No result found). Scheduled for a WWE/pap in 5/2025.     She reports having GC/CT screening in the past 1 year. Denies any new sexual partners since STI screening.     Gardasil:Pt Unsure     Past Medical History:   Diagnosis Date    Migraines     Thyroid disease        Current Outpatient Medications:     EMGALITY  mg/mL PnIj, , Disp: , Rfl:     fexofenadine (ALLEGRA) 180 MG tablet, Take by mouth., Disp: , Rfl:     Lactobacillus rhamnosus GG (CULTURELLE) 10 billion cell capsule, Take 1 capsule by mouth once daily., Disp: , Rfl:     levothyroxine (SYNTHROID) 50 MCG tablet, Take 50 mg by mouth once daily. , Disp: , Rfl:     magnesium oxide (MAG-OX) 400 mg (241.3 mg magnesium) tablet, Take 400 mg by mouth once daily., Disp:  ", Rfl:    Review of patient's allergies indicates:   Allergen Reactions    Azithromycin Other (See Comments)    Amoxicillin Rash    Penicillins Rash     Past Surgical History:   Procedure Laterality Date    ADENOIDECTOMY      TONSILLECTOMY       Social History     Tobacco Use    Smoking status: Never    Smokeless tobacco: Never   Substance Use Topics    Alcohol use: Yes     Alcohol/week: 4.0 standard drinks of alcohol     Types: 4 Drinks containing 0.5 oz of alcohol per week    Drug use: Never     No family history on file.    Physical Exam:      PHYSICAL EXAM:  /68   Ht 5' 2" (1.575 m)   Wt 48.9 kg (107 lb 12.9 oz)   LMP 01/29/2025   BMI 19.72 kg/m²   Body mass index is 19.72 kg/m².     APPEARANCE: Well nourished, well developed, in no acute distress.  PELVIC:  External genitalia and urethra within normal limits. Vagina without lesions, without discharge, without erythema, without ulcers.  Cervix without cervical motion tenderness, non-friable. IUD strings visualized at cervical os ~ 2 cm. Uterus: normal size, mobile, non-tender.  No adnexal masses or tenderness palpated.    Verbal consent obtained       Assessment/Plan:     IUD check up  -     POCT urine pregnancy        PLAN:    BTB w/ Kyleena IUD - now resolved   UPT negative. Pelvic exam overall unremarkable w/ IUD strings visualized at cervical os at appropriate length.   Encouraged Luz Elena to follow up with any recurrent symptoms - can consider pelvic ultrasound at that time but no indication currently.     Follow up if symptoms worsen or fail to improve.            "

## 2025-03-22 ENCOUNTER — ON-DEMAND VIRTUAL (OUTPATIENT)
Dept: URGENT CARE | Facility: CLINIC | Age: 20
End: 2025-03-22
Payer: COMMERCIAL

## 2025-03-22 DIAGNOSIS — G43.911 INTRACTABLE MIGRAINE WITH STATUS MIGRAINOSUS, UNSPECIFIED MIGRAINE TYPE: Primary | ICD-10-CM

## 2025-03-22 PROCEDURE — 98006 SYNCH AUDIO-VIDEO EST MOD 30: CPT | Mod: 95,,, | Performed by: NURSE PRACTITIONER

## 2025-03-22 RX ORDER — TIZANIDINE 4 MG/1
4 TABLET ORAL EVERY 6 HOURS PRN
Qty: 20 TABLET | Refills: 0 | Status: SHIPPED | OUTPATIENT
Start: 2025-03-22 | End: 2025-04-01

## 2025-03-22 RX ORDER — SUMATRIPTAN SUCCINATE 100 MG/1
TABLET ORAL
Qty: 9 TABLET | Refills: 0 | Status: SHIPPED | OUTPATIENT
Start: 2025-03-22

## 2025-03-22 NOTE — PROGRESS NOTES
Subjective:      Patient ID: Luz Elena Mendoza is a 19 y.o. female.    Vitals:  vitals were not taken for this visit.     Chief Complaint: Migraine      Visit Type: TELE AUDIOVISUAL    Patient Location: Home     Present with the patient at the time of consultation: TELEMED PRESENT WITH PATIENT: None    Past Medical History:   Diagnosis Date    Migraines     Thyroid disease      Past Surgical History:   Procedure Laterality Date    ADENOIDECTOMY      TONSILLECTOMY       Review of patient's allergies indicates:   Allergen Reactions    Azithromycin Other (See Comments)    Amoxicillin Rash    Penicillins Rash     Medications Ordered Prior to Encounter[1]  No family history on file.    Medications Ordered                [id number] - [place], [place] - [address]   [address] [place] [address]    Telephone: [phone]   Fax: [phone]   Hours: Not open 24 hours                         E-Prescribed (2 of 2)              sumatriptan (IMITREX) 100 MG tablet    Sig: Take 1 pill as needed for migraine, may repeat in 2 hours if headache persists, do not take more than 2 tablets in 24 hours.       Start: 3/22/25     Quantity: 9 tablet Refills: 0                         tiZANidine (ZANAFLEX) 4 MG tablet    Sig: Take 1 tablet (4 mg total) by mouth every 6 (six) hours as needed (spasm).       Start: 3/22/25     Quantity: 20 tablet Refills: 0                           Ohs Peq Odvv Intake    3/22/2025  3:20 PM CDT - Filed by Patient   What is your current physical address in the event of a medical emergency? [address]   Are you able to take your vital signs? No   Please attach any relevant images or files    Is your employer contracted with [facility]? No         Patient doing virtual visit for migraine  Ongoing x 6 days  Taking nambutone but it was not working  Started medrol dose pack as prescribed by neuro (on day 2)   Went to IV place yesterday and got toradol and fluids  She is  nauseated, sensitive to light and sound  Pain 7/10  Neuro Dr. Poole in [place]    Migraine   Associated symptoms include nausea and photophobia.       Constitution: Positive for activity change.   Eyes:  Positive for photophobia.   Gastrointestinal:  Positive for nausea.   Neurological:  Positive for headaches.        Objective:   The physical exam was conducted virtually.  Physical Exam   Constitutional: No distress.   HENT:   Head: Normocephalic and atraumatic.   Nose: Nose normal.   Eyes: Conjunctivae are normal.   Pulmonary/Chest: Effort normal. No stridor. No respiratory distress.   Neurological: She is alert.   Psychiatric: Her behavior is normal. Mood and thought content normal.       Assessment:     1. Intractable migraine with status migrainosus, unspecified migraine type        Plan:       Intractable migraine with status migrainosus, unspecified migraine type  -     sumatriptan (IMITREX) 100 MG tablet; Take 1 pill as needed for migraine, may repeat in 2 hours if headache persists, do not take more than 2 tablets in 24 hours.  Dispense: 9 tablet; Refill: 0  -     tiZANidine (ZANAFLEX) 4 MG tablet; Take 1 tablet (4 mg total) by mouth every 6 (six) hours as needed (spasm).  Dispense: 20 tablet; Refill: 0    This is a chronic condition with an acute exacerbation   Continue medrol dose pack  Lie in cool dark room   Recommend rest and supportive care  Instructed patient to go the emergency room if any of the following occur:   Worsening of your head pain or no improvement within 24 hours   Repeated vomiting (unable to keep liquids down)   Fever of 100.4ºF (38ºC) or higher, or as directed by your healthcare provider   Stiff neck   Extreme drowsiness, confusion or fainting   Dizziness, vertigo (dizziness with spinning sensation)   Weakness of an arm or leg or one side of the face   Difficulty with speech or vision                     [1]   Current Outpatient Medications on File Prior to Visit   Medication Sig  Dispense Refill    EMGALITY  mg/mL PnIj       fexofenadine (ALLEGRA) 180 MG tablet Take by mouth.      Lactobacillus rhamnosus GG (CULTURELLE) 10 billion cell capsule Take 1 capsule by mouth once daily.      levothyroxine (SYNTHROID) 50 MCG tablet Take 50 mg by mouth once daily.       magnesium oxide (MAG-OX) 400 mg (241.3 mg magnesium) tablet Take 400 mg by mouth once daily.       No current facility-administered medications on file prior to visit.